# Patient Record
Sex: MALE | Race: WHITE | NOT HISPANIC OR LATINO | Employment: UNEMPLOYED | URBAN - NONMETROPOLITAN AREA
[De-identification: names, ages, dates, MRNs, and addresses within clinical notes are randomized per-mention and may not be internally consistent; named-entity substitution may affect disease eponyms.]

---

## 2018-12-28 ENCOUNTER — OFFICE VISIT (OUTPATIENT)
Dept: URGENT CARE | Facility: PHYSICIAN GROUP | Age: 58
End: 2018-12-28
Payer: MEDICAID

## 2018-12-28 VITALS
BODY MASS INDEX: 27.91 KG/M2 | HEART RATE: 86 BPM | SYSTOLIC BLOOD PRESSURE: 150 MMHG | TEMPERATURE: 98.7 F | RESPIRATION RATE: 16 BRPM | DIASTOLIC BLOOD PRESSURE: 88 MMHG | OXYGEN SATURATION: 97 % | WEIGHT: 189 LBS

## 2018-12-28 DIAGNOSIS — B86 SCABIES: ICD-10-CM

## 2018-12-28 DIAGNOSIS — L02.91 ABSCESS: ICD-10-CM

## 2018-12-28 PROCEDURE — 99214 OFFICE O/P EST MOD 30 MIN: CPT | Performed by: NURSE PRACTITIONER

## 2018-12-28 RX ORDER — DOXYCYCLINE HYCLATE 100 MG/1
100 CAPSULE ORAL 2 TIMES DAILY
Qty: 20 CAP | Refills: 0 | Status: SHIPPED | OUTPATIENT
Start: 2018-12-28 | End: 2019-01-07

## 2018-12-29 NOTE — PROGRESS NOTES
"Subjective:      Miller Delacruz is a 58 y.o. male who presents with Other (scabies)    PFSH reviewed and updated as necessary in EPIC electronic record with patient today.      Hx of HIV/ AIDS recently had \" high counts\" of viral load. See's his doctor every 3 months.     Medications including OTC medications reviewed with patient.         Allergies   Allergen Reactions   • Codeine    • Penicillins    • Sulfa Drugs              HPI this is a new problem.  58-year-old male patient positive HIV positive AIDS, being treated at [facility] he was just diagnosed with scabies for scabies  has on his neck. Use permethrin to treat it.  Reports that the permethrin that he was prescribed burns his neck.  He has been scratching it intensely.  Now has a raised reddened area at the base of his skull.  He has been applying calmoseptine twice a day.  He is worried that it is a pocket of blood.  He is unable to visualize his wound.  He does have a history of abscesses.  He continues to itch on his neck and pick at it.   He has another dose of permethrin at home.  His recent viral loads were \"very high\" for the first time in years.  He usually has a 0 viral load.  He has a follow-up appointment in 3 months.  Is taking all of his medications as prescribed.    ROS  Denies fever, chills, neck pain(with movement),, shortness of breath, chest pain, nausea, vomiting.     Objective:     /88   Pulse 86   Temp 37.1 °C (98.7 °F)   Resp 16   Wt 85.7 kg (189 lb)   SpO2 97%   BMI 27.91 kg/m²      Physical Exam   Constitutional: He is oriented to person, place, and time. He appears well-developed and well-nourished.   Neck: Normal range of motion.   Cardiovascular: Normal rate and regular rhythm.    Pulmonary/Chest: Effort normal and breath sounds normal. No respiratory distress.   Neurological: He is alert and oriented to person, place, and time.   Skin: Skin is warm. Capillary refill takes less than 2 seconds. Rash noted. Rash " is maculopapular (With burrow marks and scabs) and urticarial.        Psychiatric: He has a normal mood and affect. His behavior is normal. Thought content normal.   Nursing note and vitals reviewed.              Assessment/Plan:       /  1. Scabies     2. Abscess  doxycycline (VIBRAMYCIN) 100 MG Cap     Warms compresses  to affected area 2-3 times a day for 15 min.   Educated in proper administration of medication(s) ordered today including safety, possible SE, risks, benefits, rationale and alternatives to therapy.   Use permethrin as prescribed  Do not scratch or pick at wounds    Return to clinic or PCP prn  if current symptoms are not resolving in a satisfactory manner or sooner if new or worsening symptoms occur.   Patient was advised of  signs and symptoms which would warrant further evaluation and /or emergent evaluation.    Patient was in agreement with this treatment plan and seemed to understand without barriers.   Questions were encouraged and answered to patients satisfaction.   Aftercare instructions given to pt/ caregiver. .

## 2022-04-01 ENCOUNTER — OFFICE VISIT (OUTPATIENT)
Dept: URGENT CARE | Facility: PHYSICIAN GROUP | Age: 62
End: 2022-04-01
Payer: MEDICAID

## 2022-04-01 ENCOUNTER — APPOINTMENT (OUTPATIENT)
Dept: RADIOLOGY | Facility: IMAGING CENTER | Age: 62
End: 2022-04-01
Payer: MEDICAID

## 2022-04-01 VITALS
TEMPERATURE: 98.6 F | RESPIRATION RATE: 16 BRPM | WEIGHT: 170 LBS | HEIGHT: 69 IN | OXYGEN SATURATION: 98 % | SYSTOLIC BLOOD PRESSURE: 142 MMHG | DIASTOLIC BLOOD PRESSURE: 92 MMHG | BODY MASS INDEX: 25.18 KG/M2 | HEART RATE: 81 BPM

## 2022-04-01 DIAGNOSIS — S49.92XA INJURY OF LEFT SHOULDER, INITIAL ENCOUNTER: ICD-10-CM

## 2022-04-01 DIAGNOSIS — S46.912A LEFT SHOULDER STRAIN, INITIAL ENCOUNTER: ICD-10-CM

## 2022-04-01 DIAGNOSIS — M62.838 MUSCLE SPASM: ICD-10-CM
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
## 2022-04-01 PROCEDURE — 99204 OFFICE O/P NEW MOD 45 MIN: CPT

## 2022-04-01 PROCEDURE — 73030 X-RAY EXAM OF SHOULDER: CPT | Mod: TC,FY,LT

## 2022-04-01 RX ORDER — BICTEGRAVIR SODIUM, EMTRICITABINE, AND TENOFOVIR ALAFENAMIDE FUMARATE 50; 200; 25 MG/1; MG/1; MG/1
1 TABLET ORAL DAILY
COMMUNITY
Start: 2022-03-16

## 2022-04-01 RX ORDER — CYCLOBENZAPRINE HCL 5 MG
5-10 TABLET ORAL 2 TIMES DAILY PRN
Qty: 30 TABLET | Refills: 0 | Status: SHIPPED | OUTPATIENT
Start: 2022-04-01

## 2022-04-01 RX ORDER — KETOROLAC TROMETHAMINE 30 MG/ML
30 INJECTION, SOLUTION INTRAMUSCULAR; INTRAVENOUS ONCE
Status: COMPLETED | OUTPATIENT
Start: 2022-04-01 | End: 2022-04-01

## 2022-04-01 RX ADMIN — KETOROLAC TROMETHAMINE 30 MG: 30 INJECTION, SOLUTION INTRAMUSCULAR; INTRAVENOUS at 19:23

## 2022-04-01 ASSESSMENT — ENCOUNTER SYMPTOMS
CHILLS: 0
DIAPHORESIS: 0
FEVER: 0
FALLS: 1
PSYCHIATRIC NEGATIVE: 1
RESPIRATORY NEGATIVE: 1
NEUROLOGICAL NEGATIVE: 1
GASTROINTESTINAL NEGATIVE: 1
EYES NEGATIVE: 1
CARDIOVASCULAR NEGATIVE: 1
MYALGIAS: 1
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
## 2024-06-12 ENCOUNTER — APPOINTMENT (OUTPATIENT)
Dept: MEDICAL GROUP | Facility: CLINIC | Age: 64
End: 2024-06-12
Payer: MEDICAID